# Patient Record
Sex: FEMALE | Race: WHITE | Employment: FULL TIME | URBAN - METROPOLITAN AREA
[De-identification: names, ages, dates, MRNs, and addresses within clinical notes are randomized per-mention and may not be internally consistent; named-entity substitution may affect disease eponyms.]

---

## 2017-06-08 ENCOUNTER — OFFICE VISIT (OUTPATIENT)
Dept: PODIATRY CLINIC | Facility: CLINIC | Age: 37
End: 2017-06-08

## 2017-06-08 DIAGNOSIS — M72.2 PLANTAR FASCIITIS, RIGHT: Primary | ICD-10-CM

## 2017-06-08 PROCEDURE — 99212 OFFICE O/P EST SF 10 MIN: CPT | Performed by: PODIATRIST

## 2017-06-08 PROCEDURE — 99203 OFFICE O/P NEW LOW 30 MIN: CPT | Performed by: PODIATRIST

## 2017-06-08 PROCEDURE — L3060 FOOT ARCH SUPP LONGITUD/META: HCPCS | Performed by: PODIATRIST

## 2017-06-08 RX ORDER — HYDROCODONE BITARTRATE AND ACETAMINOPHEN 5; 325 MG/1; MG/1
TABLET ORAL
Refills: 0 | COMMUNITY
Start: 2017-05-08

## 2017-06-08 RX ORDER — IBUPROFEN 800 MG/1
TABLET ORAL
Refills: 0 | COMMUNITY
Start: 2017-05-08

## 2017-06-08 RX ORDER — LEVETIRACETAM 500 MG/1
TABLET ORAL
Refills: 3 | COMMUNITY
Start: 2017-05-14

## 2017-06-08 RX ORDER — LAMOTRIGINE 200 MG/1
TABLET ORAL
Refills: 3 | COMMUNITY
Start: 2017-05-14

## 2017-06-08 RX ORDER — LAMOTRIGINE 100 MG/1
TABLET ORAL
Refills: 3 | COMMUNITY
Start: 2017-05-14

## 2017-06-08 NOTE — PROGRESS NOTES
HPI:    Patient ID: Jg Paris is a 30year old female. HPI  This pleasant 72-year-old female presents as a new patient to me with a chief complaint of right heel pain.   Patient states that the reason for this visit is to treatment associated with Disp:  Rfl:    SUMAtriptan Succinate (IMITREX) 100 MG Oral Tab Take 100 mg by mouth daily as needed. Disp:  Rfl:    Nabumetone 500 MG Oral Tab Take 500 mg by mouth 2 (two) times daily.  Disp:  Rfl:    Acetaminophen-Codeine #3 (TYLENOL #3) 300-30 MG Oral Tab

## 2022-12-29 ENCOUNTER — ORDER TRANSCRIPTION (OUTPATIENT)
Dept: ADMINISTRATIVE | Facility: HOSPITAL | Age: 42
End: 2022-12-29

## 2022-12-29 DIAGNOSIS — M79.604 RIGHT LEG PAIN: ICD-10-CM

## 2022-12-29 DIAGNOSIS — M54.50 RIGHT LOW BACK PAIN: Primary | ICD-10-CM

## 2023-01-09 RX ORDER — SODIUM CHLORIDE 9 MG/ML
INJECTION, SOLUTION INTRAVENOUS CONTINUOUS
OUTPATIENT
Start: 2023-01-09

## 2023-01-09 RX ORDER — DIAZEPAM 5 MG/1
10 TABLET ORAL
OUTPATIENT
Start: 2023-01-10 | End: 2023-01-10

## 2023-01-10 ENCOUNTER — HOSPITAL ENCOUNTER (OUTPATIENT)
Dept: CT IMAGING | Facility: HOSPITAL | Age: 43
Discharge: HOME | End: 2023-01-10
Attending: ORTHOPAEDIC SURGERY
Payer: MEDICAID

## 2023-01-10 ENCOUNTER — NURSE ONLY (OUTPATIENT)
Dept: LAB | Facility: HOSPITAL | Age: 43
End: 2023-01-10
Attending: ORTHOPAEDIC SURGERY
Payer: MEDICAID

## 2023-01-10 ENCOUNTER — HOSPITAL ENCOUNTER (OUTPATIENT)
Dept: GENERAL RADIOLOGY | Facility: HOSPITAL | Age: 43
Discharge: HOME | End: 2023-01-10
Attending: ORTHOPAEDIC SURGERY
Payer: MEDICAID

## 2023-01-10 VITALS
TEMPERATURE: 97 F | RESPIRATION RATE: 16 BRPM | DIASTOLIC BLOOD PRESSURE: 58 MMHG | SYSTOLIC BLOOD PRESSURE: 97 MMHG | HEIGHT: 62 IN | WEIGHT: 215 LBS | BODY MASS INDEX: 39.56 KG/M2 | HEART RATE: 68 BPM | OXYGEN SATURATION: 98 %

## 2023-01-10 VITALS
DIASTOLIC BLOOD PRESSURE: 64 MMHG | SYSTOLIC BLOOD PRESSURE: 102 MMHG | HEART RATE: 64 BPM | RESPIRATION RATE: 15 BRPM | OXYGEN SATURATION: 98 % | TEMPERATURE: 98 F

## 2023-01-10 DIAGNOSIS — Z98.1 STATUS POST LUMBAR SPINAL FUSION: Primary | ICD-10-CM

## 2023-01-10 DIAGNOSIS — Z98.1 STATUS POST LUMBAR SPINAL FUSION: ICD-10-CM

## 2023-01-10 DIAGNOSIS — M79.604 RIGHT LEG PAIN: ICD-10-CM

## 2023-01-10 DIAGNOSIS — M54.50 RIGHT LOW BACK PAIN: ICD-10-CM

## 2023-01-10 LAB
ERYTHROCYTE [DISTWIDTH] IN BLOOD BY AUTOMATED COUNT: 15.7 %
HCG UR QL: NEGATIVE
HCT VFR BLD AUTO: 43.4 %
HGB BLD-MCNC: 13.7 G/DL
INR BLD: 0.96 (ref 0.85–1.16)
MCH RBC QN AUTO: 27.5 PG (ref 26–34)
MCHC RBC AUTO-ENTMCNC: 31.6 G/DL (ref 31–37)
MCV RBC AUTO: 87.1 FL
PLATELET # BLD AUTO: 288 10(3)UL (ref 150–450)
PROTHROMBIN TIME: 12.8 SECONDS (ref 11.6–14.8)
RBC # BLD AUTO: 4.98 X10(6)UL
WBC # BLD AUTO: 6 X10(3) UL (ref 4–11)

## 2023-01-10 PROCEDURE — 85610 PROTHROMBIN TIME: CPT

## 2023-01-10 PROCEDURE — 85027 COMPLETE CBC AUTOMATED: CPT | Performed by: RADIOLOGY

## 2023-01-10 PROCEDURE — 36415 COLL VENOUS BLD VENIPUNCTURE: CPT

## 2023-01-10 PROCEDURE — 62304 MYELOGRAPHY LUMBAR INJECTION: CPT | Performed by: ORTHOPAEDIC SURGERY

## 2023-01-10 PROCEDURE — 72132 CT LUMBAR SPINE W/DYE: CPT | Performed by: ORTHOPAEDIC SURGERY

## 2023-01-10 PROCEDURE — 81025 URINE PREGNANCY TEST: CPT | Performed by: RADIOLOGY

## 2023-01-10 RX ORDER — DIAZEPAM 5 MG/1
TABLET ORAL
Status: DISPENSED
Start: 2023-01-10 | End: 2023-01-10

## 2023-01-10 RX ORDER — ACETAMINOPHEN AND CODEINE PHOSPHATE 300; 30 MG/1; MG/1
1 TABLET ORAL ONCE
Status: COMPLETED | OUTPATIENT
Start: 2023-01-10 | End: 2023-01-10

## 2023-01-10 RX ADMIN — ACETAMINOPHEN AND CODEINE PHOSPHATE 1 TABLET: 300; 30 TABLET ORAL at 09:40:00

## 2023-01-10 NOTE — PROCEDURES
[facility]  Procedure Note    Irene Ramirez Patient Status:  Outpatient    3/10/1980 MRN [id number]   [facility] X-RAY Attending [address] [place] [address] # 0 PCP CULLEN OWUSU     Procedure: Lumbar myelogram    Pre-Procedure Diagnosis:  Back pain    Post-Procedure Diagnosis: Same    Anesthesia:  Local    Findings:  Contrast in thecal sac    Specimens: None    Blood Loss:  < 5 cc    Tourniquet Time: None  Complications:  None  Drains:  None    Secondary Diagnosis:  N/A    Moy Bustillo MD  1/10/2023 caffeine

## (undated) NOTE — MR AVS SNAPSHOT
[facility]  [address] [facility], [address] [facility]   [phone]               Thank you for choosing us for your health care visit with Jeff Aguilar DPM.  We are glad to serve you and happy to provide you with this summ Commonly known as:  NORCO           * HYDROcodone-acetaminophen 5-325 MG Tabs   TK 1-2 TS PO Q 6 H PRN P   Commonly known as:  NORCO           ibuprofen 800 MG Tabs   Commonly known as:  MOTRIN           IMITREX 100 MG Tabs   Generic drug:  SUMAtriptan Suc If you have questions, you can call [phone] to talk to our [facility] Staff. Remember, [employer] is NOT to be used for urgent needs. For medical emergencies, dial 911.         Educational Information     Healthy Diet and Regular Exercise  The F

## (undated) NOTE — LETTER
To: Dr Aric Haque   Date:  12/30/2022  Fax #: [phone]            Patient Name: Heidi Collins / Sex: 3/10/1980-A: 38 y  female  Phone:                                          CSN: [id number]                                   Medical Records: [id number]    Request for History & Physical for Radiology Procedure at [facility]    The above patient is scheduled to have a procedure performed in Radiology. In order for the procedure to be performed safely, a comprehensive History & Physical, to include the Review of Systems, is required within 30 days of the scheduled appointment. Procedure:       Date Scheduled:     Ordered by (Ordering Physician):     Please FAX the completed H&P to [facility] at [phone]. For Questions: Call [facility] [phone]    If you cannot provide us with a comprehensive History & Physical prior to this appointment, you will need to cancel and reschedule the appointment by calling Central Scheduling [phone]. Thank you.